# Patient Record
Sex: MALE | Race: BLACK OR AFRICAN AMERICAN | NOT HISPANIC OR LATINO | Employment: FULL TIME | URBAN - METROPOLITAN AREA
[De-identification: names, ages, dates, MRNs, and addresses within clinical notes are randomized per-mention and may not be internally consistent; named-entity substitution may affect disease eponyms.]

---
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
## 2019-05-24 ENCOUNTER — RECORDS - HEALTHEAST (OUTPATIENT)
Dept: LAB | Facility: CLINIC | Age: 19
End: 2019-05-24

## 2019-05-24 LAB
BASOPHILS # BLD AUTO: 0 THOU/UL (ref 0–0.2)
BASOPHILS NFR BLD AUTO: 0 % (ref 0–2)
CHOLEST SERPL-MCNC: 122 MG/DL
EOSINOPHIL COUNT (ABSOLUTE): 0.3 THOU/UL (ref 0–0.4)
EOSINOPHIL NFR BLD AUTO: 6 % (ref 0–6)
ERYTHROCYTE [DISTWIDTH] IN BLOOD BY AUTOMATED COUNT: 12.3 % (ref 11–14.5)
FASTING STATUS PATIENT QL REPORTED: YES
HCT VFR BLD AUTO: 45.2 % (ref 40–54)
HDLC SERPL-MCNC: 41 MG/DL
HGB BLD-MCNC: 15.3 G/DL (ref 14–18)
LDLC SERPL CALC-MCNC: 72 MG/DL
LYMPHOCYTES # BLD AUTO: 2.5 THOU/UL (ref 0.8–4.4)
LYMPHOCYTES NFR BLD AUTO: 49 % (ref 20–40)
MCH RBC QN AUTO: 29.3 PG (ref 27–34)
MCHC RBC AUTO-ENTMCNC: 33.8 G/DL (ref 32–36)
MCV RBC AUTO: 87 FL (ref 80–100)
MONOCYTES # BLD AUTO: 1 THOU/UL (ref 0–0.9)
MONOCYTES NFR BLD AUTO: 20 % (ref 2–10)
PLAT MORPH BLD: NORMAL
PLATELET # BLD AUTO: 205 THOU/UL (ref 140–440)
PMV BLD AUTO: 11.4 FL (ref 8.5–12.5)
RBC # BLD AUTO: 5.22 MILL/UL (ref 4.4–6.2)
REACTIVE LYMPHS: ABNORMAL
TOTAL NEUTROPHILS-ABS(DIFF): 1.4 THOU/UL (ref 2–7.7)
TOTAL NEUTROPHILS-REL(DIFF): 26 % (ref 50–70)
TRIGL SERPL-MCNC: 47 MG/DL
WBC: 5.2 THOU/UL (ref 4–11)

## 2023-10-31 ENCOUNTER — OFFICE VISIT (OUTPATIENT)
Dept: URGENT CARE | Facility: URGENT CARE | Age: 23
End: 2023-10-31
Payer: COMMERCIAL

## 2023-10-31 VITALS
HEART RATE: 76 BPM | DIASTOLIC BLOOD PRESSURE: 83 MMHG | OXYGEN SATURATION: 97 % | TEMPERATURE: 98.2 F | SYSTOLIC BLOOD PRESSURE: 146 MMHG | WEIGHT: 175.9 LBS
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
## 2023-10-31 DIAGNOSIS — M54.50 ACUTE BILATERAL LOW BACK PAIN WITHOUT SCIATICA: ICD-10-CM

## 2023-10-31 PROCEDURE — 99203 OFFICE O/P NEW LOW 30 MIN: CPT

## 2023-10-31 RX ORDER — AMOXICILLIN 500 MG/1
CAPSULE ORAL
COMMUNITY
Start: 2023-10-20

## 2023-10-31 NOTE — PROGRESS NOTES
Assessment & Plan   (W19.XXXA) Fall, initial encounter  (primary encounter diagnosis)    (M54.50) Acute bilateral low back pain without sciatica    Informed the patient that he can use Tylenol and or ibuprofen as needed for pain with the maximum dose Tylenol being 4000 mg in a 24-hour period of time and to take ibuprofen with food avoid upset stomach.  We discussed using ice to the area for pain.  Work note provided.  Informed him to return to clinic with any new or worsening symptoms.  We discussed going to the emergency department with any nausea, vomiting, blurred vision, dizziness, lightheadedness, balance issues, confusion and/or worsening pain.  Patient acknowledged his understanding of the above plan.    The use of Dragon/Eloquii dictation services may have been used to construct the content in this note; any grammatical or spelling errors are non-intentional. Please contact the author of this note directly if you are in need of any clarification.      Win Zeng, DEMETRIO [facility]    Mariel Martinez is a 23 year old male who presents to clinic today for the following health issues:  Chief Complaint   Patient presents with    Fall     Pt was walking upstairs and slipped and fell off top stairs, pt fell on his back, back of the head hit each step, having pain in lower back and back of head. Pt has not taken anything for the pain.      HPI  Patient indicates that he slipped and fell down 4 stairs earlier today.  He states that he hit the back of his head and has been having lower back pain since the incident.  He has not taken any medication or utilized any treatment for the pain.    ROS:  Negative except noted above.    Review of Systems        Objective    BP (!) 146/83   Pulse 76   Temp 98.2  F (36.8  C) (Tympanic)   Wt 79.8 kg (175 lb 14.4 oz)   SpO2 97%   Physical Exam   GENERAL: healthy, alert and no distress  EYES: Eyes grossly normal to inspection,  PERRL and conjunctivae and sclerae normal  HENT: normal cephalic/atraumatic  NECK: no adenopathy, no asymmetry, masses, or scars and thyroid normal to palpation  RESP: lungs clear to auscultation - no rales, rhonchi or wheezes  ABDOMEN: soft, nontender, no hepatosplenomegaly, no masses and bowel sounds normal  MS: no gross musculoskeletal defects noted, no edema  SKIN: no suspicious lesions or rashes  NEURO: Normal strength and tone, mentation intact and speech normal  PSYCH: mentation appears normal, affect normal/bright

## 2023-10-31 NOTE — PATIENT INSTRUCTIONS
Can use Tylenol and/or ibuprofen as needed for pain.  Maximum dose of Tylenol is 4000mg in a 24 hour period of time.  Take ibuprofen with food to avoid stomach upset.  Use ice to the area for pain.  Return to clinic with any new or worsening symptoms.  Go to the emergency department with any nausea, vomiting, blurred vision, dizziness, lightheadedness, balance issues, confusion and/or worsening pain.

## 2023-10-31 NOTE — LETTER
October 31, 2023      Juan Andrade  [address]   [place] [id number]        To Whom It May Concern:    Juan Andrade  was seen on October 31, 2023.  Please excuse him from work until November 2nd due to injury.        Sincerely,        DEMETRIO Ohara CNP